# Patient Record
Sex: FEMALE | Race: BLACK OR AFRICAN AMERICAN | Employment: FULL TIME | URBAN - METROPOLITAN AREA
[De-identification: names, ages, dates, MRNs, and addresses within clinical notes are randomized per-mention and may not be internally consistent; named-entity substitution may affect disease eponyms.]

---

## 2018-07-07 ENCOUNTER — HOSPITAL ENCOUNTER (OUTPATIENT)
Age: 28
Discharge: HOME | End: 2018-07-07
Attending: FAMILY MEDICINE
Payer: COMMERCIAL

## 2018-07-07 VITALS
DIASTOLIC BLOOD PRESSURE: 62 MMHG | OXYGEN SATURATION: 100 % | TEMPERATURE: 99 F | RESPIRATION RATE: 12 BRPM | HEART RATE: 78 BPM | SYSTOLIC BLOOD PRESSURE: 102 MMHG

## 2018-07-07 DIAGNOSIS — S61.012A LACERATION OF LEFT THUMB WITHOUT FOREIGN BODY WITHOUT DAMAGE TO NAIL, INITIAL ENCOUNTER: Primary | ICD-10-CM

## 2018-07-07 PROCEDURE — 12002 RPR S/N/AX/GEN/TRNK2.6-7.5CM: CPT

## 2018-07-07 PROCEDURE — 90471 IMMUNIZATION ADMIN: CPT

## 2018-07-07 PROCEDURE — 99203 OFFICE O/P NEW LOW 30 MIN: CPT

## 2018-07-07 RX ORDER — CEPHALEXIN 500 MG/1
500 CAPSULE ORAL 2 TIMES DAILY
Qty: 10 CAPSULE | Refills: 0 | Status: SHIPPED | OUTPATIENT
Start: 2018-07-07 | End: 2018-07-12

## 2018-07-07 NOTE — ED PROVIDER NOTES
Patient Seen in: [address]    History   Patient presents with:  Laceration Abrasion (integumentary)    Stated Complaint: cut left thumb    HPI  23-year-old female presents to IC about 12 hours after she sustained a lace Skin: She is not diaphoretic. Nursing note and vitals reviewed.         ED Course   Labs Reviewed - No data to display    ED Course as of Jul 07 1208  ------------------------------------------------------------  Patient declines Xray of her thumb check

## 2018-07-07 NOTE — ED INITIAL ASSESSMENT (HPI)
Patient cut her left thumb today following a fall and catching herself on a garbage can. Unknown last tetanus shot.

## 2018-07-27 ENCOUNTER — OFFICE VISIT (OUTPATIENT)
Dept: FAMILY MEDICINE CLINIC | Facility: CLINIC | Age: 28
End: 2018-07-27
Payer: COMMERCIAL

## 2018-07-27 VITALS
DIASTOLIC BLOOD PRESSURE: 62 MMHG | BODY MASS INDEX: 36.87 KG/M2 | TEMPERATURE: 98 F | HEIGHT: 65.5 IN | WEIGHT: 224 LBS | SYSTOLIC BLOOD PRESSURE: 107 MMHG | HEART RATE: 76 BPM

## 2018-07-27 DIAGNOSIS — R22.1 LOCALIZED SWELLING, MASS OR LUMP OF NECK: Primary | ICD-10-CM

## 2018-07-27 PROCEDURE — 99202 OFFICE O/P NEW SF 15 MIN: CPT | Performed by: FAMILY MEDICINE

## 2018-07-27 PROCEDURE — 99212 OFFICE O/P EST SF 10 MIN: CPT | Performed by: FAMILY MEDICINE

## 2018-07-27 NOTE — PROGRESS NOTES
HPI:    Grace Dey is a 25year old female presents to clinic to establish care. Last week, she noticed a lump on her chin. Denies pain or sudden increase in size. No fevers, chills, nausea, vomiting, or other symptoms.        HISTORY:  History rev Vanessa Wallace MD

## 2018-08-21 ENCOUNTER — OFFICE VISIT (OUTPATIENT)
Dept: FAMILY MEDICINE CLINIC | Facility: CLINIC | Age: 28
End: 2018-08-21
Payer: COMMERCIAL

## 2018-08-21 ENCOUNTER — LAB ENCOUNTER (OUTPATIENT)
Dept: LAB | Age: 28
End: 2018-08-21
Attending: FAMILY MEDICINE
Payer: COMMERCIAL

## 2018-08-21 VITALS
HEART RATE: 76 BPM | SYSTOLIC BLOOD PRESSURE: 110 MMHG | DIASTOLIC BLOOD PRESSURE: 74 MMHG | BODY MASS INDEX: 36.05 KG/M2 | HEIGHT: 65.5 IN | WEIGHT: 219 LBS | TEMPERATURE: 98 F

## 2018-08-21 DIAGNOSIS — Z80.3 FAMILY HISTORY OF BREAST CANCER: ICD-10-CM

## 2018-08-21 DIAGNOSIS — Z01.419 WELL WOMAN EXAM WITH ROUTINE GYNECOLOGICAL EXAM: ICD-10-CM

## 2018-08-21 DIAGNOSIS — Z15.09 BRCA GENE POSITIVE: ICD-10-CM

## 2018-08-21 DIAGNOSIS — Z15.01 BRCA GENE POSITIVE: ICD-10-CM

## 2018-08-21 DIAGNOSIS — Z01.419 WELL WOMAN EXAM WITH ROUTINE GYNECOLOGICAL EXAM: Primary | ICD-10-CM

## 2018-08-21 LAB
ALBUMIN SERPL BCP-MCNC: 4 G/DL (ref 3.5–4.8)
ALBUMIN/GLOB SERPL: 1.2 {RATIO} (ref 1–2)
ALP SERPL-CCNC: 56 U/L (ref 32–100)
ALT SERPL-CCNC: 13 U/L (ref 14–54)
ANION GAP SERPL CALC-SCNC: 8 MMOL/L (ref 0–18)
AST SERPL-CCNC: 20 U/L (ref 15–41)
BASOPHILS # BLD: 0.1 K/UL (ref 0–0.2)
BASOPHILS NFR BLD: 1 %
BILIRUB SERPL-MCNC: 0.3 MG/DL (ref 0.3–1.2)
BUN SERPL-MCNC: 6 MG/DL (ref 8–20)
BUN/CREAT SERPL: 6.7 (ref 10–20)
CALCIUM SERPL-MCNC: 9.4 MG/DL (ref 8.5–10.5)
CHLORIDE SERPL-SCNC: 102 MMOL/L (ref 95–110)
CHOLEST SERPL-MCNC: 179 MG/DL (ref 110–200)
CO2 SERPL-SCNC: 24 MMOL/L (ref 22–32)
CREAT SERPL-MCNC: 0.89 MG/DL (ref 0.5–1.5)
EOSINOPHIL # BLD: 0.2 K/UL (ref 0–0.7)
EOSINOPHIL NFR BLD: 3 %
ERYTHROCYTE [DISTWIDTH] IN BLOOD BY AUTOMATED COUNT: 14.3 % (ref 11–15)
GLOBULIN PLAS-MCNC: 3.3 G/DL (ref 2.5–3.7)
GLUCOSE SERPL-MCNC: 79 MG/DL (ref 70–99)
HCT VFR BLD AUTO: 36.3 % (ref 35–48)
HDLC SERPL-MCNC: 55 MG/DL
HGB BLD-MCNC: 12.2 G/DL (ref 12–16)
LDLC SERPL CALC-MCNC: 104 MG/DL (ref 0–99)
LYMPHOCYTES # BLD: 2.2 K/UL (ref 1–4)
LYMPHOCYTES NFR BLD: 29 %
MCH RBC QN AUTO: 31.5 PG (ref 27–32)
MCHC RBC AUTO-ENTMCNC: 33.7 G/DL (ref 32–37)
MCV RBC AUTO: 93.7 FL (ref 80–100)
MONOCYTES # BLD: 0.5 K/UL (ref 0–1)
MONOCYTES NFR BLD: 6 %
NEUTROPHILS # BLD AUTO: 4.6 K/UL (ref 1.8–7.7)
NEUTROPHILS NFR BLD: 61 %
NONHDLC SERPL-MCNC: 124 MG/DL
OSMOLALITY UR CALC.SUM OF ELEC: 275 MOSM/KG (ref 275–295)
PATIENT FASTING: NO
PLATELET # BLD AUTO: 300 K/UL (ref 140–400)
PMV BLD AUTO: 8.6 FL (ref 7.4–10.3)
POTASSIUM SERPL-SCNC: 3.9 MMOL/L (ref 3.3–5.1)
PROT SERPL-MCNC: 7.3 G/DL (ref 5.9–8.4)
RBC # BLD AUTO: 3.87 M/UL (ref 3.7–5.4)
SODIUM SERPL-SCNC: 134 MMOL/L (ref 136–144)
TRIGL SERPL-MCNC: 99 MG/DL (ref 1–149)
TSH SERPL-ACNC: 1.63 UIU/ML (ref 0.45–5.33)
WBC # BLD AUTO: 7.5 K/UL (ref 4–11)

## 2018-08-21 PROCEDURE — 87389 HIV-1 AG W/HIV-1&-2 AB AG IA: CPT

## 2018-08-21 PROCEDURE — 86780 TREPONEMA PALLIDUM: CPT

## 2018-08-21 PROCEDURE — 85025 COMPLETE CBC W/AUTO DIFF WBC: CPT

## 2018-08-21 PROCEDURE — 80061 LIPID PANEL: CPT

## 2018-08-21 PROCEDURE — 80053 COMPREHEN METABOLIC PANEL: CPT

## 2018-08-21 PROCEDURE — 84443 ASSAY THYROID STIM HORMONE: CPT

## 2018-08-21 PROCEDURE — 99395 PREV VISIT EST AGE 18-39: CPT | Performed by: FAMILY MEDICINE

## 2018-08-21 PROCEDURE — 36415 COLL VENOUS BLD VENIPUNCTURE: CPT

## 2018-08-22 LAB
C TRACH DNA SPEC QL NAA+PROBE: NEGATIVE
HIV1+2 AB SERPL QL IA: NONREACTIVE
N GONORRHOEA DNA SPEC QL NAA+PROBE: NEGATIVE
T PALLIDUM AB SER QL: NEGATIVE

## 2018-08-23 LAB — HPV I/H RISK 1 DNA SPEC QL NAA+PROBE: NEGATIVE

## 2018-08-27 ENCOUNTER — OFFICE VISIT (OUTPATIENT)
Dept: OTOLARYNGOLOGY | Facility: CLINIC | Age: 28
End: 2018-08-27
Payer: COMMERCIAL

## 2018-08-27 VITALS
SYSTOLIC BLOOD PRESSURE: 109 MMHG | HEIGHT: 65 IN | TEMPERATURE: 98 F | WEIGHT: 219 LBS | BODY MASS INDEX: 36.49 KG/M2 | DIASTOLIC BLOOD PRESSURE: 74 MMHG

## 2018-08-27 DIAGNOSIS — R59.0 SUBMENTAL LYMPHADENOPATHY: Primary | ICD-10-CM

## 2018-08-27 PROCEDURE — 99212 OFFICE O/P EST SF 10 MIN: CPT | Performed by: OTOLARYNGOLOGY

## 2018-08-27 PROCEDURE — 99243 OFF/OP CNSLTJ NEW/EST LOW 30: CPT | Performed by: OTOLARYNGOLOGY

## 2018-08-27 NOTE — PROGRESS NOTES
Lesly Reyes is a 25year old female.   Patient presents with:  Throat Problem: Lump on throat under jaw since 7/25/18      HISTORY OF PRESENT ILLNESS    She presents with sudden onset of swelling under her jaw that she noted for the first time on July 2 bruising.            PHYSICAL EXAM    /74 (BP Location: Left arm, Patient Position: Sitting, Cuff Size: adult)   Temp 98.1 °F (36.7 °C) (Tympanic)   Ht 5' 5\" (1.651 m)   Wt 219 lb (99.3 kg)   BMI 36.44 kg/m²        Constitutional Normal Overall appea could be a source of lymphadenopathy.  - CT SOFT TISSUE OF NECK(CONTRAST ONLY) (CPT=70491); Future            Cedric Whipple MD    8/27/2018    6:08 PM

## 2018-08-29 ENCOUNTER — TELEPHONE (OUTPATIENT)
Dept: FAMILY MEDICINE CLINIC | Facility: CLINIC | Age: 28
End: 2018-08-29

## 2018-08-29 NOTE — TELEPHONE ENCOUNTER
Pt states during her OV on 7/27 Dr. Lakeisha Watson recommended her to see a nutritionist and she forgot the name, please advise.

## 2018-08-30 ENCOUNTER — TELEPHONE (OUTPATIENT)
Dept: OTOLARYNGOLOGY | Facility: CLINIC | Age: 28
End: 2018-08-30

## 2018-09-27 ENCOUNTER — HOSPITAL ENCOUNTER (OUTPATIENT)
Dept: MAMMOGRAPHY | Facility: HOSPITAL | Age: 28
End: 2018-09-27
Attending: FAMILY MEDICINE
Payer: COMMERCIAL

## 2018-09-27 ENCOUNTER — HOSPITAL ENCOUNTER (OUTPATIENT)
Dept: CT IMAGING | Facility: HOSPITAL | Age: 28
Discharge: HOME | End: 2018-09-27
Attending: OTOLARYNGOLOGY
Payer: COMMERCIAL

## 2018-09-27 DIAGNOSIS — R59.0 SUBMENTAL LYMPHADENOPATHY: ICD-10-CM

## 2018-09-27 PROCEDURE — 70491 CT SOFT TISSUE NECK W/DYE: CPT | Performed by: OTOLARYNGOLOGY

## 2024-01-28 ENCOUNTER — HOSPITAL ENCOUNTER (EMERGENCY)
Age: 34
Discharge: LEFT BEFORE DISCHARGE | End: 2024-01-28

## 2024-10-22 NOTE — PROGRESS NOTES
----- Message from Saad Graham MD sent at 10/22/2024  7:27 AM CDT -----  Please call patient and remind her to get her labs done at [facility] (BMP and A1c)  ----- Message -----  From: Saad Graham MD  Sent: 10/21/2024  12:00 AM CDT  To: Saad Graham MD    Make sure A1C done at [facility]   HPI:    Grace Dey is a 25year old female presents to clinic for a a well woman exam.   No major concerns or changes. Family history of cancer, has tested positive for BRCA gene. Needs mammograms yearly. Normal appetite, improving her diet.  Nor reactive to light. Neck: Normal range of motion. Neck supple. No thyromegaly present. Cardiovascular: Normal rate, regular rhythm and normal heart sounds. Pulmonary/Chest: Effort normal and breath sounds normal. No respiratory distress.  She has no w Saba Santos MD

## (undated) NOTE — LETTER
Meera Mart, [address] [facility] [address], [place]       08/27/18        Patient: Myra Rodriguez   YOB: 1990   Date of Visit: 8/27/2018       Dear  Dr. Arnulfo Gambino MD,      Thank you for referring Myra Rodriguez to my practic